# Patient Record
Sex: FEMALE | URBAN - METROPOLITAN AREA
[De-identification: names, ages, dates, MRNs, and addresses within clinical notes are randomized per-mention and may not be internally consistent; named-entity substitution may affect disease eponyms.]

---

## 2019-03-27 ENCOUNTER — IMPORTED ENCOUNTER (OUTPATIENT)
Dept: URBAN - METROPOLITAN AREA CLINIC 1 | Facility: CLINIC | Age: 34
End: 2019-03-27

## 2019-03-27 PROBLEM — H52.13: Noted: 2019-03-27

## 2019-03-27 PROCEDURE — S0620 ROUTINE OPHTHALMOLOGICAL EXA: HCPCS

## 2019-03-27 NOTE — PATIENT DISCUSSION
1. Myopia -- Rx was given for correction if indicated and requested. CTL trials given today. 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 Return for an appointment in 1 week for a CC with Dr. Natalya Canseco. Return for an appointment in 1 year for a 40/cc with Dr. Natalya Canseco.

## 2019-04-04 ENCOUNTER — IMPORTED ENCOUNTER (OUTPATIENT)
Dept: URBAN - METROPOLITAN AREA CLINIC 1 | Facility: CLINIC | Age: 34
End: 2019-04-04

## 2019-04-04 NOTE — PATIENT DISCUSSION
1. CC Today OU -- Good Fit Comfort and Vision OU. Finalized CTL Rx was given to patient today. Return for an appointment in 1 YR for a 36 / CC OU with Dr. Cesia Melvin.

## 2022-04-02 ASSESSMENT — KERATOMETRY
OD_AXISANGLE2_DEGREES: 073
OS_AXISANGLE2_DEGREES: 080
OD_K2POWER_DIOPTERS: 42.25
OS_K1POWER_DIOPTERS: 42.50
OS_AXISANGLE_DEGREES: 170
OD_K1POWER_DIOPTERS: 42.50
OS_K2POWER_DIOPTERS: 43.25
OD_AXISANGLE_DEGREES: 163

## 2022-04-02 ASSESSMENT — VISUAL ACUITY
OU_SC: 20/20
OD_CC: J1+
OD_SC: 20/25
OS_SC: 20/25
OU_CC: J1+
OS_CC: J1+
OS_SC: 20/20
OD_SC: 20/20

## 2022-04-02 ASSESSMENT — TONOMETRY
OD_IOP_MMHG: 15
OS_IOP_MMHG: 14